# Patient Record
Sex: FEMALE | Race: BLACK OR AFRICAN AMERICAN | NOT HISPANIC OR LATINO | Employment: FULL TIME | URBAN - METROPOLITAN AREA
[De-identification: names, ages, dates, MRNs, and addresses within clinical notes are randomized per-mention and may not be internally consistent; named-entity substitution may affect disease eponyms.]

---

## 2018-05-09 ENCOUNTER — HOSPITAL ENCOUNTER (EMERGENCY)
Facility: OTHER | Age: 28
Discharge: HOME | End: 2018-05-10
Attending: EMERGENCY MEDICINE
Payer: MEDICAID

## 2018-05-09 VITALS
BODY MASS INDEX: 33.23 KG/M2 | DIASTOLIC BLOOD PRESSURE: 61 MMHG | TEMPERATURE: 99 F | HEIGHT: 62 IN | SYSTOLIC BLOOD PRESSURE: 111 MMHG | RESPIRATION RATE: 18 BRPM | WEIGHT: 180.56 LBS | HEART RATE: 88 BPM | OXYGEN SATURATION: 100 %

## 2018-05-09 DIAGNOSIS — J06.9 UPPER RESPIRATORY TRACT INFECTION, UNSPECIFIED TYPE: Primary | ICD-10-CM

## 2018-05-09 DIAGNOSIS — B34.9 VIRAL SYNDROME: ICD-10-CM

## 2018-05-09 DIAGNOSIS — N39.0 URINARY TRACT INFECTION WITHOUT HEMATURIA, SITE UNSPECIFIED: ICD-10-CM

## 2018-05-09 LAB
ALBUMIN SERPL BCP-MCNC: 3.1 G/DL
ALP SERPL-CCNC: 75 U/L
ALT SERPL W/O P-5'-P-CCNC: 10 U/L
ANION GAP SERPL CALC-SCNC: 12 MMOL/L
AST SERPL-CCNC: 15 U/L
B-HCG UR QL: NEGATIVE
BACTERIA #/AREA URNS HPF: ABNORMAL /HPF
BASOPHILS # BLD AUTO: 0.01 K/UL
BASOPHILS NFR BLD: 0.1 %
BILIRUB SERPL-MCNC: 0.3 MG/DL
BILIRUB UR QL STRIP: NEGATIVE
BUN SERPL-MCNC: 9 MG/DL
CALCIUM SERPL-MCNC: 8.5 MG/DL
CHLORIDE SERPL-SCNC: 104 MMOL/L
CLARITY UR: ABNORMAL
CO2 SERPL-SCNC: 22 MMOL/L
COLOR UR: YELLOW
CREAT SERPL-MCNC: 0.8 MG/DL
CTP QC/QA: YES
DIFFERENTIAL METHOD: ABNORMAL
EOSINOPHIL # BLD AUTO: 0 K/UL
EOSINOPHIL NFR BLD: 0.3 %
ERYTHROCYTE [DISTWIDTH] IN BLOOD BY AUTOMATED COUNT: 13.6 %
EST. GFR  (AFRICAN AMERICAN): >60 ML/MIN/1.73 M^2
EST. GFR  (NON AFRICAN AMERICAN): >60 ML/MIN/1.73 M^2
GLUCOSE SERPL-MCNC: 101 MG/DL
GLUCOSE UR QL STRIP: NEGATIVE
HCT VFR BLD AUTO: 35.4 %
HGB BLD-MCNC: 11.4 G/DL
HGB UR QL STRIP: ABNORMAL
KETONES UR QL STRIP: NEGATIVE
LACTATE SERPL-SCNC: 1.3 MMOL/L
LEUKOCYTE ESTERASE UR QL STRIP: ABNORMAL
LYMPHOCYTES # BLD AUTO: 1.7 K/UL
LYMPHOCYTES NFR BLD: 24.7 %
MCH RBC QN AUTO: 28.1 PG
MCHC RBC AUTO-ENTMCNC: 32.2 G/DL
MCV RBC AUTO: 87 FL
MICROSCOPIC COMMENT: ABNORMAL
MONOCYTES # BLD AUTO: 0.7 K/UL
MONOCYTES NFR BLD: 10.1 %
NEUTROPHILS # BLD AUTO: 4.4 K/UL
NEUTROPHILS NFR BLD: 64.7 %
NITRITE UR QL STRIP: NEGATIVE
PH UR STRIP: 6 [PH] (ref 5–8)
PLATELET # BLD AUTO: 221 K/UL
PMV BLD AUTO: 10.9 FL
POTASSIUM SERPL-SCNC: 3.7 MMOL/L
PROT SERPL-MCNC: 7.3 G/DL
PROT UR QL STRIP: NEGATIVE
RBC # BLD AUTO: 4.06 M/UL
RBC #/AREA URNS HPF: 5 /HPF (ref 0–4)
SODIUM SERPL-SCNC: 138 MMOL/L
SP GR UR STRIP: 1.01 (ref 1–1.03)
SQUAMOUS #/AREA URNS HPF: 5 /HPF
URN SPEC COLLECT METH UR: ABNORMAL
UROBILINOGEN UR STRIP-ACNC: NEGATIVE EU/DL
WBC # BLD AUTO: 6.75 K/UL
WBC #/AREA URNS HPF: 12 /HPF (ref 0–5)

## 2018-05-09 PROCEDURE — 99283 EMERGENCY DEPT VISIT LOW MDM: CPT | Mod: 25

## 2018-05-09 PROCEDURE — 96360 HYDRATION IV INFUSION INIT: CPT

## 2018-05-09 PROCEDURE — 83605 ASSAY OF LACTIC ACID: CPT

## 2018-05-09 PROCEDURE — 81000 URINALYSIS NONAUTO W/SCOPE: CPT

## 2018-05-09 PROCEDURE — 85025 COMPLETE CBC W/AUTO DIFF WBC: CPT

## 2018-05-09 PROCEDURE — 80053 COMPREHEN METABOLIC PANEL: CPT

## 2018-05-09 PROCEDURE — 81025 URINE PREGNANCY TEST: CPT | Performed by: EMERGENCY MEDICINE

## 2018-05-09 PROCEDURE — 25000003 PHARM REV CODE 250: Performed by: EMERGENCY MEDICINE

## 2018-05-09 RX ORDER — IBUPROFEN 600 MG/1
600 TABLET ORAL EVERY 6 HOURS PRN
Qty: 20 TABLET | Refills: 0 | Status: SHIPPED | OUTPATIENT
Start: 2018-05-09 | End: 2018-08-06

## 2018-05-09 RX ORDER — SULFAMETHOXAZOLE AND TRIMETHOPRIM 800; 160 MG/1; MG/1
1 TABLET ORAL 2 TIMES DAILY
Qty: 6 TABLET | Refills: 0 | Status: SHIPPED | OUTPATIENT
Start: 2018-05-09 | End: 2018-05-12

## 2018-05-09 RX ORDER — OXYMETAZOLINE HCL 0.05 %
1 SPRAY, NON-AEROSOL (ML) NASAL
Status: COMPLETED | OUTPATIENT
Start: 2018-05-09 | End: 2018-05-09

## 2018-05-09 RX ADMIN — SODIUM CHLORIDE 1000 ML: 0.9 INJECTION, SOLUTION INTRAVENOUS at 10:05

## 2018-05-09 RX ADMIN — OXYMETAZOLINE HYDROCHLORIDE 1 SPRAY: 5 SPRAY NASAL at 10:05

## 2018-05-10 NOTE — ED TRIAGE NOTES
"Pt presents with c/o nasal congestion, onset Monday. + chills, generalized body aches, subjective fever. + cough, productive with "yellowish" mucous. + diarrhea, denies blood in stool. Pt denies NV. pt denies burning with urination. Pt took tylenol cold and flu with mild relief of symptoms. Pt in NAD.    "

## 2018-05-10 NOTE — ED PROVIDER NOTES
Encounter Date: 5/9/2018    SCRIBE #1 NOTE: I, Ced Awan, am scribing for, and in the presence of, Dr. Palomares .       History     Chief Complaint   Patient presents with    Chills     Pt came to the ED tonight c/o chills, nasal congestion, and head pressure. Pt symptoms since Monday. Pt has taken tylenol cold and flu with no relief of symptoms      Time seen by provider: 10:21 PM    This is a 27 y.o. female who presents with multiple complaints that began two days ago. She has been in contact with her daughter, who has been sick the last few days. She has been experiencing subjective fever, chills, sinus congestion, myalgias, cough, sore throat, headaches, generalized body weakness, and diarrhea (three episodes today). She has taken Tylenol cold and flu with mild relief (last used prior to arrival). Patient denies experiencing chest pain, SOB, nausea, vomiting, dizziness, syncope, rashes, dysuria, or urinary retention. Patient reports to the ED for evaluation because she has missed work the last two days. She has history of endometriosis, but denies use of prescription medications. NKDA. She is not pregnant.        The history is provided by the patient ( at bedside).     Review of patient's allergies indicates:  No Known Allergies  Past Medical History:   Diagnosis Date    Endometriosis      Past Surgical History:   Procedure Laterality Date    endometriosis procedure       History reviewed. No pertinent family history.  Social History   Substance Use Topics    Smoking status: Never Smoker    Smokeless tobacco: Never Used    Alcohol use Yes      Comment: occasionally      Review of Systems   Constitutional: Positive for chills and fever. Negative for activity change, appetite change and diaphoresis.   HENT: Positive for congestion and sore throat. Negative for trouble swallowing.    Eyes: Negative for photophobia and visual disturbance.   Respiratory: Positive for cough. Negative for chest tightness  "and shortness of breath.    Cardiovascular: Negative for chest pain.   Gastrointestinal: Positive for diarrhea. Negative for abdominal pain, nausea and vomiting.   Endocrine: Negative for polydipsia and polyuria.   Genitourinary: Negative for decreased urine volume and dysuria.   Musculoskeletal: Positive for myalgias.   Skin: Negative for rash.   Neurological: Positive for weakness and headaches. Negative for dizziness and syncope.   Psychiatric/Behavioral: Negative for confusion.       Physical Exam     Initial Vitals [05/09/18 2141]   BP Pulse Resp Temp SpO2   (!) 105/57 (!) 116 18 98.9 °F (37.2 °C) --      MAP       73         Vitals:    05/09/18 2141 05/09/18 2301 05/09/18 2330 05/09/18 2354   BP: (!) 105/57 119/68 (!) 108/58    Pulse: (!) 116 96 90    Resp: 18      Temp: 98.9 °F (37.2 °C)   99 °F (37.2 °C)   TempSrc: Oral   Oral   SpO2:  100% 100%    Weight: 81.9 kg (180 lb 8.9 oz)      Height: 5' 2" (1.575 m)       05/09/18 2356   BP: 111/61   Pulse: 88   Resp:    Temp:    TempSrc:    SpO2: 100%   Weight:    Height:        Physical Exam    Nursing note and vitals reviewed.  Constitutional: She appears well-developed and well-nourished. She is cooperative.  Non-toxic appearance. No distress.   HENT:   Head: Normocephalic and atraumatic.   Mouth/Throat: Oropharynx is clear and moist.   Swollen turbinates present.    Eyes: Conjunctivae and EOM are normal. Pupils are equal, round, and reactive to light.   Neck: Normal range of motion and full passive range of motion without pain. Neck supple.   Cardiovascular: Regular rhythm, normal heart sounds and normal pulses. Tachycardia present.  Exam reveals no gallop and no friction rub.    No murmur heard.  Pulmonary/Chest: Effort normal and breath sounds normal. No respiratory distress.   Abdominal: Soft. Normal appearance. She exhibits no distension. There is no tenderness.   Hyperactive bowel sounds present.    Musculoskeletal: Normal range of motion.   Neurological: " She is alert and oriented to person, place, and time. She has normal strength. No cranial nerve deficit or sensory deficit.   Skin: Skin is warm, dry and intact. No rash noted.   Psychiatric: She has a normal mood and affect. Her speech is normal and behavior is normal. Judgment and thought content normal.         ED Course   Procedures  Labs Reviewed   CBC W/ AUTO DIFFERENTIAL - Abnormal; Notable for the following:        Result Value    Hemoglobin 11.4 (*)     Hematocrit 35.4 (*)     All other components within normal limits   COMPREHENSIVE METABOLIC PANEL - Abnormal; Notable for the following:     CO2 22 (*)     Calcium 8.5 (*)     Albumin 3.1 (*)     All other components within normal limits   URINALYSIS - Abnormal; Notable for the following:     Appearance, UA Hazy (*)     Occult Blood UA 2+ (*)     Leukocytes, UA 2+ (*)     All other components within normal limits   URINALYSIS MICROSCOPIC - Abnormal; Notable for the following:     RBC, UA 5 (*)     WBC, UA 12 (*)     All other components within normal limits   LACTIC ACID, PLASMA   POCT URINE PREGNANCY             Medical Decision Making:   Clinical Tests:   Lab Tests: Reviewed and Ordered  ED Management:  Emergent evaluation of 27-year-old female with complaint of a URI with multiple symptoms.  Vital signs significant for tachycardia and hypotension.  She admits to fluid loss through diarrhea.  She was treated with a liter of fluid and nasal spray for congestion with much improvement.  Vital signs normalized.  Sepsis markers are negative and no leukocytosis or lactic acidosis.  Urinalysis did show evidence of a UTI, although I doubt this is cause of most of her symptoms, we will treat with antibiotics.  She is discharged in good condition with prescription for 3 days of Bactrim, and advised on symptomatic care.  She's encouraged to follow-up with her PCP or to return here for new or worsening symptoms.            Scribe Attestation:   Scribe #1: I performed  the above scribed service and the documentation accurately describes the services I performed. I attest to the accuracy of the note.    Attending Attestation:           Physician Attestation for Scribe:  Physician Attestation Statement for Scribe #1: I, Dr. Palomares, reviewed documentation, as scribed by Ced Awan  in my presence, and it is both accurate and complete.                    Clinical Impression:     1. Upper respiratory tract infection, unspecified type    2. Viral syndrome    3. Urinary tract infection without hematuria, site unspecified                                 Denise Palomares MD  05/10/18 0030

## 2018-05-10 NOTE — ED NOTES
NEURO: Pt AAO x 4. Behavior and speech appropriate to situation.   CARDIAC: Regular rhythm auscultated, + tachycardia  RESPIRATORY: Respirations even and unlabored. Breath sounds clear to all lung fields.   MUSCULOSKELETAL: Active ROM noted to extremities

## 2018-08-06 ENCOUNTER — HOSPITAL ENCOUNTER (EMERGENCY)
Facility: OTHER | Age: 28
Discharge: HOME | End: 2018-08-06
Attending: EMERGENCY MEDICINE
Payer: MEDICAID

## 2018-08-06 VITALS
BODY MASS INDEX: 31.89 KG/M2 | SYSTOLIC BLOOD PRESSURE: 111 MMHG | DIASTOLIC BLOOD PRESSURE: 64 MMHG | OXYGEN SATURATION: 100 % | TEMPERATURE: 99 F | RESPIRATION RATE: 18 BRPM | WEIGHT: 180 LBS | HEART RATE: 74 BPM | HEIGHT: 63 IN

## 2018-08-06 DIAGNOSIS — R11.2 NAUSEA, VOMITING, AND DIARRHEA: Primary | ICD-10-CM

## 2018-08-06 DIAGNOSIS — N30.00 ACUTE CYSTITIS WITHOUT HEMATURIA: ICD-10-CM

## 2018-08-06 DIAGNOSIS — R19.7 NAUSEA, VOMITING, AND DIARRHEA: Primary | ICD-10-CM

## 2018-08-06 LAB
ALBUMIN SERPL BCP-MCNC: 3.4 G/DL
ALP SERPL-CCNC: 72 U/L
ALT SERPL W/O P-5'-P-CCNC: 11 U/L
ANION GAP SERPL CALC-SCNC: 11 MMOL/L
AST SERPL-CCNC: 20 U/L
B-HCG UR QL: NEGATIVE
BACTERIA #/AREA URNS HPF: ABNORMAL /HPF
BASOPHILS # BLD AUTO: 0.01 K/UL
BASOPHILS NFR BLD: 0.3 %
BILIRUB SERPL-MCNC: 0.2 MG/DL
BILIRUB UR QL STRIP: NEGATIVE
BUN SERPL-MCNC: 7 MG/DL
CALCIUM SERPL-MCNC: 8.3 MG/DL
CHLORIDE SERPL-SCNC: 103 MMOL/L
CLARITY UR: ABNORMAL
CO2 SERPL-SCNC: 23 MMOL/L
COLOR UR: YELLOW
CREAT SERPL-MCNC: 0.7 MG/DL
CTP QC/QA: YES
DIFFERENTIAL METHOD: ABNORMAL
EOSINOPHIL # BLD AUTO: 0 K/UL
EOSINOPHIL NFR BLD: 0.7 %
ERYTHROCYTE [DISTWIDTH] IN BLOOD BY AUTOMATED COUNT: 13.5 %
EST. GFR  (AFRICAN AMERICAN): >60 ML/MIN/1.73 M^2
EST. GFR  (NON AFRICAN AMERICAN): >60 ML/MIN/1.73 M^2
GLUCOSE SERPL-MCNC: 80 MG/DL
GLUCOSE UR QL STRIP: NEGATIVE
HCT VFR BLD AUTO: 36.9 %
HGB BLD-MCNC: 11.9 G/DL
HGB UR QL STRIP: ABNORMAL
KETONES UR QL STRIP: NEGATIVE
LEUKOCYTE ESTERASE UR QL STRIP: ABNORMAL
LIPASE SERPL-CCNC: 23 U/L
LYMPHOCYTES # BLD AUTO: 1.3 K/UL
LYMPHOCYTES NFR BLD: 45.3 %
MCH RBC QN AUTO: 27.6 PG
MCHC RBC AUTO-ENTMCNC: 32.2 G/DL
MCV RBC AUTO: 86 FL
MICROSCOPIC COMMENT: ABNORMAL
MONOCYTES # BLD AUTO: 0.2 K/UL
MONOCYTES NFR BLD: 8.4 %
NEUTROPHILS # BLD AUTO: 1.3 K/UL
NEUTROPHILS NFR BLD: 45 %
NITRITE UR QL STRIP: POSITIVE
PH UR STRIP: 6 [PH] (ref 5–8)
PLATELET # BLD AUTO: 235 K/UL
PMV BLD AUTO: 10.7 FL
POTASSIUM SERPL-SCNC: 4.1 MMOL/L
PROT SERPL-MCNC: 7.9 G/DL
PROT UR QL STRIP: ABNORMAL
RBC # BLD AUTO: 4.31 M/UL
RBC #/AREA URNS HPF: 0 /HPF (ref 0–4)
SODIUM SERPL-SCNC: 137 MMOL/L
SP GR UR STRIP: >=1.03 (ref 1–1.03)
SQUAMOUS #/AREA URNS HPF: 6 /HPF
URN SPEC COLLECT METH UR: ABNORMAL
UROBILINOGEN UR STRIP-ACNC: NEGATIVE EU/DL
WBC # BLD AUTO: 2.87 K/UL
WBC #/AREA URNS HPF: 23 /HPF (ref 0–5)
WBC CLUMPS URNS QL MICRO: ABNORMAL

## 2018-08-06 PROCEDURE — 85025 COMPLETE CBC W/AUTO DIFF WBC: CPT

## 2018-08-06 PROCEDURE — 96361 HYDRATE IV INFUSION ADD-ON: CPT

## 2018-08-06 PROCEDURE — 25000003 PHARM REV CODE 250: Performed by: PHYSICIAN ASSISTANT

## 2018-08-06 PROCEDURE — 96360 HYDRATION IV INFUSION INIT: CPT

## 2018-08-06 PROCEDURE — 81025 URINE PREGNANCY TEST: CPT | Performed by: EMERGENCY MEDICINE

## 2018-08-06 PROCEDURE — 81000 URINALYSIS NONAUTO W/SCOPE: CPT

## 2018-08-06 PROCEDURE — 36415 COLL VENOUS BLD VENIPUNCTURE: CPT

## 2018-08-06 PROCEDURE — 99283 EMERGENCY DEPT VISIT LOW MDM: CPT | Mod: 25

## 2018-08-06 PROCEDURE — 83690 ASSAY OF LIPASE: CPT

## 2018-08-06 PROCEDURE — 80053 COMPREHEN METABOLIC PANEL: CPT

## 2018-08-06 RX ORDER — NITROFURANTOIN 25; 75 MG/1; MG/1
100 CAPSULE ORAL 2 TIMES DAILY
Qty: 10 CAPSULE | Refills: 0 | Status: SHIPPED | OUTPATIENT
Start: 2018-08-06 | End: 2018-08-11

## 2018-08-06 RX ORDER — ONDANSETRON 4 MG/1
4 TABLET, ORALLY DISINTEGRATING ORAL EVERY 8 HOURS PRN
Qty: 12 TABLET | Refills: 0 | Status: SHIPPED | OUTPATIENT
Start: 2018-08-06 | End: 2019-01-28

## 2018-08-06 RX ORDER — ONDANSETRON 4 MG/1
4 TABLET, ORALLY DISINTEGRATING ORAL
Status: COMPLETED | OUTPATIENT
Start: 2018-08-06 | End: 2018-08-06

## 2018-08-06 RX ADMIN — SODIUM CHLORIDE 1000 ML: 0.9 INJECTION, SOLUTION INTRAVENOUS at 08:08

## 2018-08-06 RX ADMIN — ONDANSETRON 4 MG: 4 TABLET, ORALLY DISINTEGRATING ORAL at 08:08

## 2018-08-07 NOTE — ED TRIAGE NOTES
Pt reports diffuse abdominal pain with tenderness to RLQ and LLQ. Pt also reports N/V/D for past 3 days. Denies fever. Denies urinary symptoms.

## 2018-08-07 NOTE — ED PROVIDER NOTES
Encounter Date: 8/6/2018       History     Chief Complaint   Patient presents with    Abdominal Pain     Xs 3 days    Nausea    Vomiting     Patient is a 27 y.o. female presenting to the emergency department with complaints of abdominal pain, nausea, vomiting, and diarrhea.  The patient states that her symptoms started approximately 3 days ago.  She reports that today she started had 6 episodes of nonbloody emesis.  She states she is unable to quantify how many episodes of diarrhea she has had.  She reports lower abdominal pain.  She denies any fever or chills.  She denies taking any medication for symptoms thus far.  She denies dysuria hematuria.  No known sick contacts.  No previous episode.  She states her last episode of emesis was earlier today, she has been able to tolerate water since.  She reports she tried she cannot approximately 11:00 a.m. but did not keep it down. This is the extent of the patient's complaints at this time.         The history is provided by the patient.     Review of patient's allergies indicates:  No Known Allergies  Past Medical History:   Diagnosis Date    Endometriosis      Past Surgical History:   Procedure Laterality Date    endometriosis procedure       History reviewed. No pertinent family history.  Social History   Substance Use Topics    Smoking status: Never Smoker    Smokeless tobacco: Never Used    Alcohol use Yes      Comment: occasionally      Review of Systems   Constitutional: Negative for activity change, appetite change, chills, fatigue and fever.   HENT: Negative for congestion, rhinorrhea and sore throat.    Eyes: Negative for photophobia and visual disturbance.   Respiratory: Negative for cough, shortness of breath and wheezing.    Cardiovascular: Negative for chest pain.   Gastrointestinal: Positive for abdominal pain, diarrhea, nausea and vomiting.   Genitourinary: Negative for dysuria, hematuria and urgency.   Musculoskeletal: Negative for back pain,  myalgias and neck pain.   Skin: Negative for color change and wound.   Neurological: Negative for weakness and headaches.   Psychiatric/Behavioral: Negative for agitation and confusion.       Physical Exam     Initial Vitals [08/06/18 1901]   BP Pulse Resp Temp SpO2   (!) 105/55 85 18 98.7 °F (37.1 °C) 99 %      MAP       --         Physical Exam    Nursing note and vitals reviewed.  Constitutional: She appears well-developed and well-nourished. She is not diaphoretic. She is cooperative.  Non-toxic appearance. She does not have a sickly appearance. She does not appear ill. No distress.   Well appearing,  female, unaccompanied in the ED. Speaking in clear and full sentences, moving all extremities. No acute distress.    HENT:   Head: Normocephalic and atraumatic.   Right Ear: External ear normal.   Left Ear: External ear normal.   Nose: Nose normal.   Mouth/Throat: Oropharynx is clear and moist.   Eyes: Conjunctivae and EOM are normal.   Neck: Normal range of motion. Neck supple.   Cardiovascular: Normal rate, regular rhythm and normal heart sounds.   Pulmonary/Chest: Breath sounds normal. No respiratory distress. She has no wheezes.   Abdominal: Soft. Bowel sounds are normal. She exhibits no distension. There is tenderness. There is no rebound and no guarding.   Tenderness to palpation of the lower abdomen with no rebound, guarding, or mass.    Musculoskeletal: Normal range of motion.   Neurological: She is alert and oriented to person, place, and time.   Skin: Skin is warm.   Psychiatric: She has a normal mood and affect. Her behavior is normal. Judgment and thought content normal.         ED Course   Procedures  Labs Reviewed   URINALYSIS - Abnormal; Notable for the following:        Result Value    Appearance, UA Hazy (*)     Specific Gravity, UA >=1.030 (*)     Protein, UA Trace (*)     Occult Blood UA 2+ (*)     Nitrite, UA Positive (*)     Leukocytes, UA Trace (*)     All other components  within normal limits   CBC W/ AUTO DIFFERENTIAL - Abnormal; Notable for the following:     WBC 2.87 (*)     Hemoglobin 11.9 (*)     Hematocrit 36.9 (*)     Gran # (ANC) 1.3 (*)     Mono # 0.2 (*)     All other components within normal limits   COMPREHENSIVE METABOLIC PANEL - Abnormal; Notable for the following:     Calcium 8.3 (*)     Albumin 3.4 (*)     All other components within normal limits   URINALYSIS MICROSCOPIC - Abnormal; Notable for the following:     WBC, UA 23 (*)     Bacteria, UA Many (*)     All other components within normal limits   LIPASE   POCT URINE PREGNANCY           Medical Decision Making:   Initial Assessment:   Urgent evaluation of a 27 y.o. Female presenting to the emergency department complaining of abdominal pain, nausea, vomiting, and diarrhea. Patient is afebrile, nontoxic appearing and hemodynamically stable. Physical exam reveals regular rate and rhythm, lungs are clear to auscultation bilaterally. Tenderness to palpation of the lower abdomen with no rebound or guarding. Moist mucus membranes. Will plan for labs and reassess.     Clinical Tests:   Lab Tests: Ordered and Reviewed  The following lab test(s) were unremarkable: CBC, CMP, Lipase, UPT and Urinalysis  ED Management:  UPT is negative. UA shows signs concerning for UTI. CBC shows WBC of 2.87, mild anemia. Lipase normal. CMP unremarkable. Patient received PO fluids.   She is tolerating PO intake well. At this time, I do not feel that further testing or imaging is warranted. Signs and symptoms likely secondary to viral etiology as well as UTI. Will plan to treat with macrobid and zofran. Counseled on symptomatic care and treatment. Stable for discharge home. The patient was instructed to follow up with a primary care provider in 2 days or to return to the emergency department for worsening symptoms. The treatment plan was discussed with the patient who demonstrated understanding and comfort with plan. The patient's history,  physical exam, and plan of care was discussed with and agreed upon with my supervising physician.    This note was created using M Modal Fluency Direct. There may be typographical errors secondary to dictation.                       Clinical Impression:     1. Nausea, vomiting, and diarrhea    2. Acute cystitis without hematuria           Disposition:   Disposition: Discharged  Condition: Stable                        Anabel Palumbo PA-C  08/06/18 9847

## 2019-01-28 ENCOUNTER — OFFICE VISIT (OUTPATIENT)
Dept: OBSTETRICS AND GYNECOLOGY | Facility: CLINIC | Age: 29
End: 2019-01-28
Payer: MEDICAID

## 2019-01-28 ENCOUNTER — LAB VISIT (OUTPATIENT)
Dept: LAB | Facility: HOSPITAL | Age: 29
End: 2019-01-28
Attending: OBSTETRICS & GYNECOLOGY
Payer: MEDICAID

## 2019-01-28 VITALS — HEIGHT: 62 IN | BODY MASS INDEX: 31.1 KG/M2 | WEIGHT: 169 LBS

## 2019-01-28 DIAGNOSIS — Z3A.01 PREGNANCY WITH 5 COMPLETED WEEKS GESTATION: ICD-10-CM

## 2019-01-28 DIAGNOSIS — Z3A.01 PREGNANCY WITH 5 COMPLETED WEEKS GESTATION: Primary | ICD-10-CM

## 2019-01-28 LAB
ABO + RH BLD: NORMAL
BASOPHILS # BLD AUTO: 0.02 K/UL
BASOPHILS NFR BLD: 0.4 %
BLD GP AB SCN CELLS X3 SERPL QL: NORMAL
DIFFERENTIAL METHOD: ABNORMAL
EOSINOPHIL # BLD AUTO: 0 K/UL
EOSINOPHIL NFR BLD: 0.8 %
ERYTHROCYTE [DISTWIDTH] IN BLOOD BY AUTOMATED COUNT: 13.5 %
HCT VFR BLD AUTO: 33.9 %
HGB BLD-MCNC: 11.1 G/DL
HGB S BLD QL SOLY: NEGATIVE
LYMPHOCYTES # BLD AUTO: 1.8 K/UL
LYMPHOCYTES NFR BLD: 36.8 %
MCH RBC QN AUTO: 28.5 PG
MCHC RBC AUTO-ENTMCNC: 32.7 G/DL
MCV RBC AUTO: 87 FL
MONOCYTES # BLD AUTO: 0.3 K/UL
MONOCYTES NFR BLD: 6.7 %
NEUTROPHILS # BLD AUTO: 2.7 K/UL
NEUTROPHILS NFR BLD: 55.3 %
PLATELET # BLD AUTO: 303 K/UL
PMV BLD AUTO: 10.3 FL
RBC # BLD AUTO: 3.9 M/UL
WBC # BLD AUTO: 4.89 K/UL

## 2019-01-28 PROCEDURE — 85660 RBC SICKLE CELL TEST: CPT

## 2019-01-28 PROCEDURE — 99204 OFFICE O/P NEW MOD 45 MIN: CPT | Mod: TH,S$PBB,, | Performed by: OBSTETRICS & GYNECOLOGY

## 2019-01-28 PROCEDURE — 83036 HEMOGLOBIN GLYCOSYLATED A1C: CPT

## 2019-01-28 PROCEDURE — 87491 CHLMYD TRACH DNA AMP PROBE: CPT

## 2019-01-28 PROCEDURE — 99999 PR PBB SHADOW E&M-EST. PATIENT-LVL III: ICD-10-PCS | Mod: PBBFAC,,, | Performed by: OBSTETRICS & GYNECOLOGY

## 2019-01-28 PROCEDURE — 87086 URINE CULTURE/COLONY COUNT: CPT

## 2019-01-28 PROCEDURE — 36415 COLL VENOUS BLD VENIPUNCTURE: CPT

## 2019-01-28 PROCEDURE — 86762 RUBELLA ANTIBODY: CPT

## 2019-01-28 PROCEDURE — 86592 SYPHILIS TEST NON-TREP QUAL: CPT

## 2019-01-28 PROCEDURE — 99204 PR OFFICE/OUTPT VISIT, NEW, LEVL IV, 45-59 MIN: ICD-10-PCS | Mod: TH,S$PBB,, | Performed by: OBSTETRICS & GYNECOLOGY

## 2019-01-28 PROCEDURE — 85025 COMPLETE CBC W/AUTO DIFF WBC: CPT

## 2019-01-28 PROCEDURE — 86850 RBC ANTIBODY SCREEN: CPT

## 2019-01-28 PROCEDURE — 99999 PR PBB SHADOW E&M-EST. PATIENT-LVL III: CPT | Mod: PBBFAC,,, | Performed by: OBSTETRICS & GYNECOLOGY

## 2019-01-28 PROCEDURE — 99213 OFFICE O/P EST LOW 20 MIN: CPT | Mod: PBBFAC,25 | Performed by: OBSTETRICS & GYNECOLOGY

## 2019-01-28 PROCEDURE — 86703 HIV-1/HIV-2 1 RESULT ANTBDY: CPT

## 2019-01-28 PROCEDURE — 87340 HEPATITIS B SURFACE AG IA: CPT

## 2019-01-28 NOTE — PROGRESS NOTES
"Cc:  New pregnancy    HPI:  28 yro  with LMP 18 presents after being seen at [facility] for threatened miscarriage.  Pt had +HPT and then had spotting which prompted pt to go to ER on 19.  Pt denies any further bleeding.  Pelvic sono at [facility] revealed a Gestation sac too small to calculate but no fetal pole.  Pt has had 1 prior uncomplicated pregnancy and delivery.    PMHx:  Endometriosis  PSHx:  Laparoscopies x 3 for endometriosis.    Vitals:    19 1357   Weight: 76.7 kg (169 lb)   Height: 5' 2" (1.575 m)     Physical Exam   Constitutional: She is oriented to person, place, and time. She appears well-developed and well-nourished. No distress.   HENT:   Head: Normocephalic and atraumatic.   Neck: Normal range of motion.   Cardiovascular: Normal rate.   Pulmonary/Chest: Effort normal. No respiratory distress.   Abdominal: Soft. She exhibits no distension.   Neurological: She is alert and oriented to person, place, and time. No cranial nerve deficit. Coordination normal.   Skin: She is not diaphoretic.   Psychiatric: She has a normal mood and affect. Her behavior is normal. Judgment and thought content normal.   Vitals reviewed.    Assessment/Plan  1. Pregnancy with 5 completed weeks gestation  RPR    HIV 1/2 Ag/Ab (4th Gen)    Urine culture    Hepatitis B surface antigen    Hemoglobin A1c    C. trachomatis/N. gonorrhoeae by AMP DNA    CBC auto differential    Rubella antibody, IgG    Sickle cell screen    Type & Screen     Pt declines any meds for nausea at this time. F/u 3 wks for sono    "

## 2019-01-29 LAB
ESTIMATED AVG GLUCOSE: 105 MG/DL
HBA1C MFR BLD HPLC: 5.3 %
HBV SURFACE AG SERPL QL IA: NEGATIVE
HIV 1+2 AB+HIV1 P24 AG SERPL QL IA: NEGATIVE
RPR SER QL: NORMAL

## 2019-01-30 LAB
BACTERIA UR CULT: NORMAL
C TRACH DNA SPEC QL NAA+PROBE: DETECTED
N GONORRHOEA DNA SPEC QL NAA+PROBE: NOT DETECTED
RUBV IGG SER-ACNC: 16.2 IU/ML
RUBV IGG SER-IMP: REACTIVE

## 2021-12-07 NOTE — ED NOTES
Rounding has been complete. Pt resting on stretcher high españa position. NS bolus infusing. NAD noted. Pt on continuous pulse ox, and continuous BP.    None

## 2024-05-12 ENCOUNTER — HOSPITAL ENCOUNTER (EMERGENCY)
Facility: HOSPITAL | Age: 34
Discharge: HOME | End: 2024-05-12
Attending: STUDENT IN AN ORGANIZED HEALTH CARE EDUCATION/TRAINING PROGRAM
Payer: MEDICAID

## 2024-05-12 VITALS
TEMPERATURE: 99 F | HEART RATE: 76 BPM | DIASTOLIC BLOOD PRESSURE: 73 MMHG | BODY MASS INDEX: 34.96 KG/M2 | RESPIRATION RATE: 16 BRPM | HEIGHT: 62 IN | OXYGEN SATURATION: 99 % | WEIGHT: 190 LBS | SYSTOLIC BLOOD PRESSURE: 128 MMHG
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
## 2024-05-12 DIAGNOSIS — S20.211A CONTUSION OF RIB ON RIGHT SIDE, INITIAL ENCOUNTER: Primary | ICD-10-CM

## 2024-05-12 PROCEDURE — 96372 THER/PROPH/DIAG INJ SC/IM: CPT | Performed by: STUDENT IN AN ORGANIZED HEALTH CARE EDUCATION/TRAINING PROGRAM

## 2024-05-12 PROCEDURE — 99284 EMERGENCY DEPT VISIT MOD MDM: CPT | Mod: 25

## 2024-05-12 PROCEDURE — 63600175 PHARM REV CODE 636 W HCPCS: Performed by: STUDENT IN AN ORGANIZED HEALTH CARE EDUCATION/TRAINING PROGRAM

## 2024-05-12 PROCEDURE — 25000003 PHARM REV CODE 250: Performed by: STUDENT IN AN ORGANIZED HEALTH CARE EDUCATION/TRAINING PROGRAM

## 2024-05-12 RX ORDER — ACETAMINOPHEN 325 MG/1
650 TABLET ORAL
Status: COMPLETED | OUTPATIENT
Start: 2024-05-12 | End: 2024-05-12

## 2024-05-12 RX ORDER — OXYCODONE HYDROCHLORIDE 5 MG/1
5 TABLET ORAL
Status: COMPLETED | OUTPATIENT
Start: 2024-05-12 | End: 2024-05-12

## 2024-05-12 RX ORDER — KETOROLAC TROMETHAMINE 30 MG/ML
15 INJECTION, SOLUTION INTRAMUSCULAR; INTRAVENOUS
Status: COMPLETED | OUTPATIENT
Start: 2024-05-12 | End: 2024-05-12

## 2024-05-12 RX ADMIN — ACETAMINOPHEN 650 MG: 325 TABLET ORAL at 07:05

## 2024-05-12 RX ADMIN — KETOROLAC TROMETHAMINE 15 MG: 30 INJECTION, SOLUTION INTRAMUSCULAR at 07:05

## 2024-05-12 RX ADMIN — OXYCODONE 5 MG: 5 TABLET ORAL at 07:05

## 2024-05-12 NOTE — Clinical Note
"Crow Roberts" Jeovany was seen and treated in our emergency department on 5/12/2024.  She may return to work on 05/14/2024.       If you have any questions or concerns, please don't hesitate to call.      James CYR    "

## 2024-05-12 NOTE — ED NOTES
Pt states she was playing soccer with her kids x2 days ago. Pt states she woke up the next day with soreness to R lumbar/side

## 2024-05-13 NOTE — ED PROVIDER NOTES
Chief Complaint   Fall (Fall 2 days ago while playing with her kids, right sided pain. Denies LOC. )      History Of Present Illness   Crow Thayer is a 33 y.o. female with no pertinent PMHx presenting with rib pain.  Patient states that she fell 2 days ago and landed on her right side.  She states that she tripped while playing with her kids.  She states that since then she has been having pain to her right lateral ribs.  She states that she thought that the pain would go away on its own but it was persisted which made her concerned come in.  She otherwise reports no shortness of breath.  She reports no head injury, loss of consciousness.  She reports no known bruising, or wounds.  States that she does not take any blood thinners.  Has been ambulating without difficulty.  Reports no weakness, or numbness.    Independent Historian: Yes  Other Historian or Collateral: Chart review  Interpretor: No      Review of patient's allergies indicates:  No Known Allergies    No current facility-administered medications on file prior to encounter.     Current Outpatient Medications on File Prior to Encounter   Medication Sig Dispense Refill    etonogestreL (NEXPLANON) 68 mg Impl subdermal device 68 mg by Subdermal route.      amoxicillin (AMOXIL) 875 MG tablet Take 1 tablet (875 mg total) by mouth 2 (two) times daily. 14 tablet 0       Past History   As per HPI and below:  Past Medical History:   Diagnosis Date    Endometriosis      Past Surgical History:   Procedure Laterality Date    endometriosis procedure         Social History     Socioeconomic History    Marital status:    Tobacco Use    Smoking status: Never    Smokeless tobacco: Never   Substance and Sexual Activity    Alcohol use: Yes     Comment: occasionally     Drug use: No    Sexual activity: Yes     Partners: Male       Family History   Problem Relation Name Age of Onset    Hypertension Father      Diabetes Mother         Physical Exam     Vitals:  "   05/12/24 1816 05/12/24 1915 05/12/24 2035   BP: 131/71  128/73   BP Location:   Right arm   Patient Position:   Sitting   Pulse: 81  76   Resp: 20 16 16   Temp: 98.8 °F (37.1 °C)  98.9 °F (37.2 °C)   TempSrc: Oral  Oral   SpO2: 100%  99%   Weight: 86.2 kg (190 lb)     Height: 5' 2" (1.575 m)         Physical Exam  Vitals reviewed.   Constitutional:       General: She is not in acute distress.     Appearance: Normal appearance. She is not toxic-appearing.   HENT:      Head: Normocephalic and atraumatic.      Nose: No congestion.      Mouth/Throat:      Mouth: Mucous membranes are moist.   Eyes:      General: No scleral icterus.     Extraocular Movements: Extraocular movements intact.   Cardiovascular:      Rate and Rhythm: Normal rate and regular rhythm.   Pulmonary:      Effort: No respiratory distress.      Breath sounds: No wheezing or rhonchi.   Chest:      Chest wall: Tenderness present. No deformity, swelling or crepitus.          Comments: No overlying bruising, abrasions, or swelling.  Abdominal:      General: There is no distension.      Palpations: Abdomen is soft.      Tenderness: There is no abdominal tenderness. There is no guarding.   Musculoskeletal:         General: No deformity.      Cervical back: No rigidity.   Skin:     General: Skin is warm and dry.      Capillary Refill: Capillary refill takes less than 2 seconds.   Neurological:      General: No focal deficit present.      Mental Status: She is alert and oriented to person, place, and time.             Results   Labs Reviewed - No data to display    Imaging Results              X-Ray Ribs 2 View Right (Final result)  Result time 05/12/24 19:46:30      Final result by Sarmad Longoria MD (05/12/24 19:46:30)                   Impression:      1. No acute displaced right rib fracture.      Electronically signed by: Sarmad Longoria MD  Date:    05/12/2024  Time:    19:46               Narrative:    EXAMINATION:  XR RIBS 2 VIEW " RIGHT    CLINICAL HISTORY:  Unspecified fall, initial encounter    TECHNIQUE:  Two views of the right ribs were performed.    COMPARISON:  None    FINDINGS:  Four views right ribs.    No acute displaced right rib fracture.  The right shoulder appears intact.  The right lung zones are clear.                                            Initial [facility]   Medical Decision Making  Patient is a 33-year-old female presenting with right lateral chest wall pain after recent fall.  Most likely contusion but Will get x-ray to further evaluate for rib fracture.  Will treat patient's pain in the meantime and reassess.  Very low suspicion for pneumothorax/hemothorax.    Amount and/or Complexity of Data Reviewed  Radiology: ordered.    Risk  OTC drugs.  Prescription drug management.               Medications Given / Interventions     Medications   ketorolac injection 15 mg (15 mg Intramuscular Given 5/12/24 1915)   oxyCODONE immediate release tablet 5 mg (5 mg Oral Given 5/12/24 1915)   acetaminophen tablet 650 mg (650 mg Oral Given 5/12/24 1915)       Procedures     ED POCUS Performed: No    Reassessment and ED Course      X-ray independently interpreted without evidence of acute fracture.  No pneumothorax seen on x-ray either.    Discussed results with the patient.  States her pain has improved.  Discussed need for outpatient follow up with PCP and continued symptomatic management.  DC to home with return precautions.         Final diagnoses:  [W19.XXXA] Fall  [S20.211A] Contusion of rib on right side, initial encounter (Primary)           Dispo      ED Disposition Condition    Discharge Stable            ED Prescriptions    None       Follow-up Information    None                     Oliva Hoover MD  05/15/24 6260

## 2024-12-21 ENCOUNTER — HOSPITAL ENCOUNTER (EMERGENCY)
Facility: HOSPITAL | Age: 34
Discharge: HOME | End: 2024-12-21
Attending: STUDENT IN AN ORGANIZED HEALTH CARE EDUCATION/TRAINING PROGRAM
Payer: MEDICAID

## 2024-12-21 VITALS
BODY MASS INDEX: 34.96 KG/M2 | WEIGHT: 190 LBS | DIASTOLIC BLOOD PRESSURE: 68 MMHG | RESPIRATION RATE: 18 BRPM | HEART RATE: 99 BPM | OXYGEN SATURATION: 99 % | TEMPERATURE: 98 F | HEIGHT: 62 IN | SYSTOLIC BLOOD PRESSURE: 118 MMHG

## 2024-12-21 DIAGNOSIS — T78.40XA ALLERGIC REACTION, INITIAL ENCOUNTER: ICD-10-CM

## 2024-12-21 DIAGNOSIS — R21 RASH: Primary | ICD-10-CM

## 2024-12-21 PROCEDURE — 99282 EMERGENCY DEPT VISIT SF MDM: CPT

## 2024-12-21 NOTE — Clinical Note
"Crow Osborneian" Jeovany was seen and treated in our emergency department on 12/21/2024.  She may return to work on 12/22/2024.       If you have any questions or concerns, please don't hesitate to call.      Hermes Payne MD"

## 2024-12-21 NOTE — ED TRIAGE NOTES
Crow Thayer, a 34 y.o. female presents to the ED w/ complaint of rash and blisters to right forearm that started today    Triage note:  Chief Complaint   Patient presents with    Rash     Patient developed a painful, blistered rash 2 hours ago. States that it started out looking like a bruise at first and then turned into small welts. Hx of chicken pox as a child.     Review of patient's allergies indicates:  No Known Allergies  Past Medical History:   Diagnosis Date    Endometriosis          APPEARANCE: awake and alert in NAD. PAIN  5/10  SKIN: warm, dry and intact. Rash and blisters to right forearm  MUSCULOSKELETAL: Patient moving all extremities spontaneously, no obvious swelling or deformities noted. Ambulates independently.  RESPIRATORY: Denies shortness of breath.Respirations unlabored.   CARDIAC: Denies CP, 2+ distal pulses; no peripheral edema  ABDOMEN: S/ND/NT, Denies nausea  : voids spontaneously, denies difficulty  Neurologic: AAO x 4; follows commands equal strength in all extremities; denies numbness/tingling. Denies dizziness

## 2024-12-21 NOTE — Clinical Note
"Crow Osborneian" Jeovany was seen and treated in our emergency department on 12/21/2024.  She may return to work on 12/22/2024.       If you have any questions or concerns, please don't hesitate to call.      Hermes Payne MD" no

## 2024-12-21 NOTE — DISCHARGE INSTRUCTIONS
Follow-up with dermatology, return for any new or worsening pain, spreading of rash, fevers, chills, mouth rash, difficulty breathing, nausea, vomiting, or other concerning symptom. Use over the counter hydrocortisone cream as described.

## 2024-12-22 NOTE — ED PROVIDER NOTES
"Encounter Date: 12/21/2024       History     Chief Complaint   Patient presents with    Rash     Patient developed a painful, blistered rash 2 hours ago. States that it started out looking like a bruise at first and then turned into small welts. Hx of chicken pox as a child.     HPI    35 y/o F no reported PMH who presents to the ED for evaluation of a rash.  Patient states that she developed an area of swelling and redness on the arm that started suddenly after she had moved her cat.  It was on her right arm and red, with "welts." She says it has already improved significantly.  No allergies that she is aware of.  She denies any contact with new soaps/detergents/other substances.  Denies any f/c, N/V, dyspnea, CP, or abd pain.      Review of patient's allergies indicates:  No Known Allergies  Past Medical History:   Diagnosis Date    Endometriosis      Past Surgical History:   Procedure Laterality Date    endometriosis procedure       Family History   Problem Relation Name Age of Onset    Hypertension Father      Diabetes Mother       Social History     Tobacco Use    Smoking status: Never    Smokeless tobacco: Never   Substance Use Topics    Alcohol use: Yes     Comment: occasionally     Drug use: No     Review of Systems   Skin:  Positive for rash.       Physical Exam     Initial Vitals [12/21/24 1715]   BP Pulse Resp Temp SpO2   118/68 99 18 98.4 °F (36.9 °C) 99 %      MAP       --         Physical Exam    Nursing note and vitals reviewed.  Constitutional: She appears well-nourished.   HENT:   Head: Atraumatic.   Eyes: EOM are normal.   Pulmonary/Chest: No respiratory distress.   Musculoskeletal:         General: No edema. Normal range of motion.     Neurological: She is alert and oriented to person, place, and time.   Skin: Skin is warm.   Area of mild redness on the right forearm with two smaller firmer areas, appear to be resolving hives; see media photo   Psychiatric: She has a normal mood and affect. " Thought content normal.         ED Course   Procedures  Labs Reviewed   HEPATITIS C ANTIBODY   HIV 1 / 2 ANTIBODY          Imaging Results    None          Medications - No data to display  Medical Decision Making                                33 y/o F here with a rash.  VSS in ED, no stridor, no signs of anaphylaxis. Rash described as above. It does not involve the oral mucosa.   She states it has already significantly improved and swelling gone down on it's own since starting earlier today.  Not consistent with cellulitis, rapid in onset; slightly itchy seems more c/w resolving hives/allergic reaction. Not c/w shingles. No sick contacts.  We will trial a course of prednisone topical, patient states she will get it over the counter. She will follow-up with derm if not resolving in the next week or so.  Closely monitor at home, strict RTER precautions given, f/u with derm or PCP if unable; referral placed, all questions answered, stable for DC.         Clinical Impression:  Final diagnoses:  [R21] Rash (Primary)  [T78.40XA] Allergic reaction, initial encounter          ED Disposition Condition    Discharge Stable          ED Prescriptions    None       Follow-up Information       Follow up With Specialties Details Why Contact Info    [facility] - Emergency Dept Emergency Medicine  As needed [id number]  [facility] [id number] [phone]             Hermes Payne MD  12/21/24 2738

## 2025-06-16 ENCOUNTER — HOSPITAL ENCOUNTER (EMERGENCY)
Facility: HOSPITAL | Age: 35
Discharge: HOME | End: 2025-06-16
Attending: EMERGENCY MEDICINE
Payer: COMMERCIAL

## 2025-06-16 VITALS
RESPIRATION RATE: 18 BRPM | WEIGHT: 214 LBS | TEMPERATURE: 98 F | SYSTOLIC BLOOD PRESSURE: 117 MMHG | HEART RATE: 85 BPM | BODY MASS INDEX: 39.38 KG/M2 | OXYGEN SATURATION: 98 % | DIASTOLIC BLOOD PRESSURE: 71 MMHG | HEIGHT: 62 IN

## 2025-06-16 DIAGNOSIS — M79.89 LEG SWELLING: ICD-10-CM

## 2025-06-16 LAB
ABSOLUTE EOSINOPHIL (OHS): 0.11 K/UL
ABSOLUTE MONOCYTE (OHS): 0.5 K/UL (ref 0.3–1)
ABSOLUTE NEUTROPHIL COUNT (OHS): 2.92 K/UL (ref 1.8–7.7)
ALBUMIN SERPL BCP-MCNC: 3.5 G/DL (ref 3.5–5.2)
ALP SERPL-CCNC: 79 UNIT/L (ref 40–150)
ALT SERPL W/O P-5'-P-CCNC: 19 UNIT/L (ref 10–44)
ANION GAP (OHS): 10 MMOL/L (ref 8–16)
AST SERPL-CCNC: 19 UNIT/L (ref 11–45)
B-HCG UR QL: NEGATIVE
BACTERIA #/AREA URNS AUTO: ABNORMAL /HPF
BASOPHILS # BLD AUTO: 0.02 K/UL
BASOPHILS NFR BLD AUTO: 0.3 %
BILIRUB SERPL-MCNC: 0.3 MG/DL (ref 0.1–1)
BILIRUB UR QL STRIP.AUTO: NEGATIVE
BNP SERPL-MCNC: <10 PG/ML (ref 0–99)
BUN SERPL-MCNC: 11 MG/DL (ref 6–20)
CALCIUM SERPL-MCNC: 9.2 MG/DL (ref 8.7–10.5)
CHLORIDE SERPL-SCNC: 106 MMOL/L (ref 95–110)
CLARITY UR: CLEAR
CO2 SERPL-SCNC: 23 MMOL/L (ref 23–29)
COLOR UR AUTO: YELLOW
CREAT SERPL-MCNC: 0.7 MG/DL (ref 0.5–1.4)
CTP QC/QA: YES
ERYTHROCYTE [DISTWIDTH] IN BLOOD BY AUTOMATED COUNT: 14.3 % (ref 11.5–14.5)
GFR SERPLBLD CREATININE-BSD FMLA CKD-EPI: >60 ML/MIN/1.73/M2
GLUCOSE SERPL-MCNC: 81 MG/DL (ref 70–110)
GLUCOSE UR QL STRIP: NEGATIVE
HCT VFR BLD AUTO: 34.7 % (ref 37–48.5)
HCV AB SERPL QL IA: NORMAL
HGB BLD-MCNC: 11.2 GM/DL (ref 12–16)
HGB UR QL STRIP: ABNORMAL
HIV 1+2 AB+HIV1 P24 AG SERPL QL IA: NORMAL
HOLD SPECIMEN: NORMAL
IMM GRANULOCYTES # BLD AUTO: 0.01 K/UL (ref 0–0.04)
IMM GRANULOCYTES NFR BLD AUTO: 0.2 % (ref 0–0.5)
KETONES UR QL STRIP: NEGATIVE
LEUKOCYTE ESTERASE UR QL STRIP: NEGATIVE
LYMPHOCYTES # BLD AUTO: 2.26 K/UL (ref 1–4.8)
MCH RBC QN AUTO: 28.1 PG (ref 27–31)
MCHC RBC AUTO-ENTMCNC: 32.3 G/DL (ref 32–36)
MCV RBC AUTO: 87 FL (ref 82–98)
MICROSCOPIC COMMENT: ABNORMAL
NITRITE UR QL STRIP: NEGATIVE
NUCLEATED RBC (/100WBC) (OHS): 0 /100 WBC
OHS QRS DURATION: 80 MS
OHS QTC CALCULATION: 436 MS
PH UR STRIP: 7 [PH]
PLATELET # BLD AUTO: 373 K/UL (ref 150–450)
PMV BLD AUTO: 11.8 FL (ref 9.2–12.9)
POTASSIUM SERPL-SCNC: 4.1 MMOL/L (ref 3.5–5.1)
PROT SERPL-MCNC: 7.1 GM/DL (ref 6–8.4)
PROT UR QL STRIP: NEGATIVE
RBC # BLD AUTO: 3.98 M/UL (ref 4–5.4)
RBC #/AREA URNS AUTO: 13 /HPF (ref 0–4)
RELATIVE EOSINOPHIL (OHS): 1.9 %
RELATIVE LYMPHOCYTE (OHS): 38.8 % (ref 18–48)
RELATIVE MONOCYTE (OHS): 8.6 % (ref 4–15)
RELATIVE NEUTROPHIL (OHS): 50.2 % (ref 38–73)
SODIUM SERPL-SCNC: 139 MMOL/L (ref 136–145)
SP GR UR STRIP: 1.03
SQUAMOUS #/AREA URNS AUTO: 4 /HPF
TROPONIN I SERPL HS-MCNC: <3 NG/L
UROBILINOGEN UR STRIP-ACNC: ABNORMAL EU/DL
WBC # BLD AUTO: 5.82 K/UL (ref 3.9–12.7)
WBC #/AREA URNS AUTO: 2 /HPF (ref 0–5)

## 2025-06-16 PROCEDURE — 87389 HIV-1 AG W/HIV-1&-2 AB AG IA: CPT | Performed by: PHYSICIAN ASSISTANT

## 2025-06-16 PROCEDURE — 81001 URINALYSIS AUTO W/SCOPE: CPT | Performed by: PHYSICIAN ASSISTANT

## 2025-06-16 PROCEDURE — 86803 HEPATITIS C AB TEST: CPT | Performed by: PHYSICIAN ASSISTANT

## 2025-06-16 PROCEDURE — 99284 EMERGENCY DEPT VISIT MOD MDM: CPT | Mod: 25

## 2025-06-16 PROCEDURE — 81025 URINE PREGNANCY TEST: CPT | Performed by: PHYSICIAN ASSISTANT

## 2025-06-16 PROCEDURE — 84484 ASSAY OF TROPONIN QUANT: CPT | Performed by: PHYSICIAN ASSISTANT

## 2025-06-16 PROCEDURE — 83880 ASSAY OF NATRIURETIC PEPTIDE: CPT | Performed by: PHYSICIAN ASSISTANT

## 2025-06-16 PROCEDURE — 84132 ASSAY OF SERUM POTASSIUM: CPT | Performed by: PHYSICIAN ASSISTANT

## 2025-06-16 PROCEDURE — 93010 ELECTROCARDIOGRAM REPORT: CPT | Mod: ,,, | Performed by: INTERNAL MEDICINE

## 2025-06-16 PROCEDURE — 93005 ELECTROCARDIOGRAM TRACING: CPT

## 2025-06-16 PROCEDURE — 85025 COMPLETE CBC W/AUTO DIFF WBC: CPT | Performed by: PHYSICIAN ASSISTANT

## 2025-06-16 NOTE — Clinical Note
"Crow Osborneian" Jeovany was seen and treated in our emergency department on 6/16/2025.  She may return to work on 06/17/2025.       If you have any questions or concerns, please don't hesitate to call.      Elsa Garcia MD"

## 2025-06-16 NOTE — PROVIDER PROGRESS NOTES - EMERGENCY DEPT.
Results for orders placed or performed during the hospital encounter of 06/16/25   POCT urine pregnancy    Collection Time: 06/16/25 12:29 AM   Result Value Ref Range    POC Preg Test, Ur Negative Negative     Acceptable Yes    Urinalysis, Reflex to Urine Culture Urine, Clean Catch    Collection Time: 06/16/25 12:46 AM    Specimen: Urine, Clean Catch   Result Value Ref Range    Color, UA Yellow Straw, Julia, Yellow, Light-Orange    Appearance, UA Clear Clear    pH, UA 7.0 5.0 - 8.0    Spec Grav UA 1.030 1.005 - 1.030    Protein, UA Negative Negative    Glucose, UA Negative Negative    Ketones, UA Negative Negative    Bilirubin, UA Negative Negative    Blood, UA 1+ (A) Negative    Nitrites, UA Negative Negative    Urobilinogen, UA 2.0-3.0 (A) <2.0 EU/dL    Leukocyte Esterase, UA Negative Negative   Urinalysis Microscopic    Collection Time: 06/16/25 12:46 AM   Result Value Ref Range    RBC, UA 13 (H) 0 - 4 /HPF    WBC, UA 2 0 - 5 /HPF    Bacteria, UA Occasional None, Rare, Occasional /HPF    Squamous Epithelial Cells, UA 4 /HPF    Microscopic Comment     Hepatitis C Antibody    Collection Time: 06/16/25  1:05 AM   Result Value Ref Range    Hep C Ab Interp Non-Reactive Non-Reactive   HIV 1/2 Ag/Ab (4th Gen)    Collection Time: 06/16/25  1:05 AM   Result Value Ref Range    HIV 1/2 Ag/Ab Non-Reactive Non-Reactive   Brain natriuretic peptide    Collection Time: 06/16/25  1:05 AM   Result Value Ref Range    BNP <10 0 - 99 pg/mL   Troponin I High Sensitivity    Collection Time: 06/16/25  1:05 AM   Result Value Ref Range    Troponin High Sensitive <3 <=14 ng/L   CBC with Differential    Collection Time: 06/16/25  1:05 AM   Result Value Ref Range    WBC 5.82 3.90 - 12.70 K/uL    RBC 3.98 (L) 4.00 - 5.40 M/uL    HGB 11.2 (L) 12.0 - 16.0 gm/dL    HCT 34.7 (L) 37.0 - 48.5 %    MCV 87 82 - 98 fL    MCH 28.1 27.0 - 31.0 pg    MCHC 32.3 32.0 - 36.0 g/dL    RDW 14.3 11.5 - 14.5 %    Platelet Count 373 150 - 450 K/uL     MPV 11.8 9.2 - 12.9 fL    Nucleated RBC 0 <=0 /100 WBC    Neut % 50.2 38 - 73 %    Lymph % 38.8 18 - 48 %    Mono % 8.6 4 - 15 %    Eos % 1.9 <=8 %    Basophil % 0.3 <=1.9 %    Imm Grans % 0.2 0.0 - 0.5 %    Neut # 2.92 1.8 - 7.7 K/uL    Lymph # 2.26 1 - 4.8 K/uL    Mono # 0.50 0.3 - 1 K/uL    Eos # 0.11 <=0.5 K/uL    Baso # 0.02 <=0.2 K/uL    Imm Grans # 0.01 0.00 - 0.04 K/uL   Comprehensive metabolic panel    Collection Time: 06/16/25  1:59 AM   Result Value Ref Range    Sodium 139 136 - 145 mmol/L    Potassium 4.1 3.5 - 5.1 mmol/L    Chloride 106 95 - 110 mmol/L    CO2 23 23 - 29 mmol/L    Glucose 81 70 - 110 mg/dL    BUN 11 6 - 20 mg/dL    Creatinine 0.7 0.5 - 1.4 mg/dL    Calcium 9.2 8.7 - 10.5 mg/dL    Protein Total 7.1 6.0 - 8.4 gm/dL    Albumin 3.5 3.5 - 5.2 g/dL    Bilirubin Total 0.3 0.1 - 1.0 mg/dL    ALP 79 40 - 150 unit/L    AST 19 11 - 45 unit/L    ALT 19 10 - 44 unit/L    Anion Gap 10 8 - 16 mmol/L    eGFR >60 >60 mL/min/1.73/m2    Patient is signed out by MEAGHAN Paredes pending BNP and CMP.  Labs unremarkable.  Patient is stable for discharge home with outpatient follow up.

## 2025-06-16 NOTE — Clinical Note
"Crow Thayer (Kristian) was seen and treated in our emergency department on 6/16/2025.  She may return to work on 06/17/2025.       If you have any questions or concerns, please don't hesitate to call.       RN    "

## 2025-06-16 NOTE — ED PROVIDER NOTES
Encounter Date: 6/16/2025       History     Chief Complaint   Patient presents with    Joint Swelling     Pt denies any other symptoms. Denies any other medical hx.     34-year-old female with history of endometriosis presents for bilateral ankle swelling which started today.  She notes that she had crawfish earlier today.  Reports this is happened in the past without clear cause.  She did not stand for long periods of time for work.  She denies any other complaints, no chest pain shortness of breath, no fever or swelling of other joints.      Review of patient's allergies indicates:  No Known Allergies  Past Medical History:   Diagnosis Date    Endometriosis      Past Surgical History:   Procedure Laterality Date    endometriosis procedure       Family History   Problem Relation Name Age of Onset    Hypertension Father      Diabetes Mother       Social History[1]  Review of Systems    Physical Exam     Initial Vitals [06/16/25 0004]   BP Pulse Resp Temp SpO2   117/71 85 18 98.2 °F (36.8 °C) 98 %      MAP       --         Physical Exam    Nursing note and vitals reviewed.  Constitutional: She appears well-developed and well-nourished.   Cardiovascular:  Normal rate, regular rhythm, normal heart sounds and intact distal pulses.           Trace nonpitting edema bilateral ankles   Pulmonary/Chest: Breath sounds normal.   Musculoskeletal:         General: Normal range of motion.     Skin: Skin is warm and dry.         ED Course   Procedures  Labs Reviewed   URINALYSIS, REFLEX TO URINE CULTURE - Abnormal       Result Value    Color, UA Yellow      Appearance, UA Clear      pH, UA 7.0      Spec Grav UA 1.030      Protein, UA Negative      Glucose, UA Negative      Ketones, UA Negative      Bilirubin, UA Negative      Blood, UA 1+ (*)     Nitrites, UA Negative      Urobilinogen, UA 2.0-3.0 (*)     Leukocyte Esterase, UA Negative     CBC WITH DIFFERENTIAL - Abnormal    WBC 5.82      RBC 3.98 (*)     HGB 11.2 (*)     HCT  34.7 (*)     MCV 87      MCH 28.1      MCHC 32.3      RDW 14.3      Platelet Count 373      MPV 11.8      Nucleated RBC 0      Neut % 50.2      Lymph % 38.8      Mono % 8.6      Eos % 1.9      Basophil % 0.3      Imm Grans % 0.2      Neut # 2.92      Lymph # 2.26      Mono # 0.50      Eos # 0.11      Baso # 0.02      Imm Grans # 0.01     URINALYSIS MICROSCOPIC - Abnormal    RBC, UA 13 (*)     WBC, UA 2      Bacteria, UA Occasional      Squamous Epithelial Cells, UA 4      Microscopic Comment       HEPATITIS C ANTIBODY - Normal    Hep C Ab Interp Non-Reactive     HIV 1 / 2 ANTIBODY - Normal    HIV 1/2 Ag/Ab Non-Reactive     B-TYPE NATRIURETIC PEPTIDE - Normal    BNP <10     TROPONIN I HIGH SENSITIVITY - Normal    Troponin High Sensitive <3     CBC W/ AUTO DIFFERENTIAL    Narrative:     The following orders were created for panel order CBC auto differential.  Procedure                               Abnormality         Status                     ---------                               -----------         ------                     CBC with Differential[[id number]]        Abnormal            Final result                 Please view results for these tests on the individual orders.   HEP C VIRUS HOLD SPECIMEN   COMPREHENSIVE METABOLIC PANEL   GREY TOP URINE HOLD   EXTRA TUBES    Narrative:     The following orders were created for panel order EXTRA TUBES.  Procedure                               Abnormality         Status                     ---------                               -----------         ------                     Light Green Top Hold[[id number]]                            In process                   Please view results for these tests on the individual orders.   LIGHT GREEN TOP HOLD   POCT URINE PREGNANCY    POC Preg Test, Ur Negative       Acceptable Yes            Imaging Results    None          Medications - No data to display  Medical Decision Making  34-year-old female presenting for  bilateral ankle swelling.  Her vitals are normal and she is nontoxic appearing.    Differential diagnosis:  Dependent edema due to fluid retention   Doubt albuminemia or nephrotic syndrome   Doubt CHF  Doubt renal dysfunction    Will check labs, reassess.    Labs are so far reassuring.  Dispo pending CMP.  I am signing out to Elsa Garcia MD.    2:19 AM      Amount and/or Complexity of Data Reviewed  Labs: ordered. Decision-making details documented in ED Course.               ED Course as of 06/16/25 0219 Mon Jun 16, 2025   0031 hCG Qualitative, Urine: Negative [CC]   0121 Leukocyte Esterase, UA: Negative [CC]   0121 Protein, UA: Negative [CC]   0151 Troponin I High Sensitivity: <3 [CC]   0218 BNP: <10 [CC]      ED Course User Index  [CC] Argelia Paredes PA-C                           Clinical Impression:  Final diagnoses:  [M79.89] Leg swelling                       [1]   Social History  Tobacco Use    Smoking status: Never    Smokeless tobacco: Never   Substance Use Topics    Alcohol use: Yes     Comment: occasionally     Drug use: No        Argelia Paredes PA-C  06/16/25 0219

## 2025-06-16 NOTE — ED TRIAGE NOTES
"Crow Thayer, a 34 y.o. female presents to the ED w/ complaint of bilateral ankle swelling x 1 day without tenderness. Pt states she ate some crawfish today for fathers day, but "not a lot". States this has happened before but it went down on its own. PMS intact, pt ambulatory. Pt denies cardiac hx, denies SOB.     Triage note:  Chief Complaint   Patient presents with    Joint Swelling     Pt denies any other symptoms. Denies any other medical hx.     Review of patient's allergies indicates:  No Known Allergies  Past Medical History:   Diagnosis Date    Endometriosis        " Patient scheduled.

## 2025-06-16 NOTE — DISCHARGE INSTRUCTIONS
Your urine did have a slight amount of blood in it.  If you are not own your menstrual cycle, have this rechecked by your primary physician.